# Patient Record
Sex: FEMALE
[De-identification: names, ages, dates, MRNs, and addresses within clinical notes are randomized per-mention and may not be internally consistent; named-entity substitution may affect disease eponyms.]

---

## 2022-11-01 PROBLEM — Z00.00 ENCOUNTER FOR PREVENTIVE HEALTH EXAMINATION: Status: ACTIVE | Noted: 2022-11-01

## 2022-11-02 ENCOUNTER — APPOINTMENT (OUTPATIENT)
Dept: UROLOGY | Facility: CLINIC | Age: 87
End: 2022-11-02

## 2022-11-02 VITALS
WEIGHT: 145 LBS | SYSTOLIC BLOOD PRESSURE: 145 MMHG | DIASTOLIC BLOOD PRESSURE: 82 MMHG | TEMPERATURE: 97.3 F | HEIGHT: 64 IN | BODY MASS INDEX: 24.75 KG/M2 | OXYGEN SATURATION: 95 % | HEART RATE: 67 BPM | RESPIRATION RATE: 16 BRPM

## 2022-11-02 DIAGNOSIS — N39.0 URINARY TRACT INFECTION, SITE NOT SPECIFIED: ICD-10-CM

## 2022-11-02 PROCEDURE — 51798 US URINE CAPACITY MEASURE: CPT

## 2022-11-02 PROCEDURE — 99204 OFFICE O/P NEW MOD 45 MIN: CPT

## 2022-11-04 NOTE — ASSESSMENT
[FreeTextEntry1] : Pt is an 86yo female presenting with recurrent UTI's. She denies any urinary symptoms today but does report vaginal irritation. I have send urine for culture as well as a vaginal swab.  She did not bring her prior results with her today.  We will reach out to her PCP for prior urine results. \par \par 1.UA/ UC \par 2. Vaginal swab.

## 2022-11-04 NOTE — HISTORY OF PRESENT ILLNESS
[FreeTextEntry1] : Pt is an 86 yo F who presents today with history of recurrent UTIs.  The patient denies any urinary symptoms at this time. She complains of vaginal irritation and reports "small bumps on her labia when she has a UTI". \par She denies vaginal itching. She states she has been treated for uti's  multiple times within the last year by her PCP Dr. Nyla Elias with Cipro and Nitrofurantoin with no resolution of symptoms. She urinates during the day 3-4x and at night 2x. Patient states when she laughs or coughs she leaks a small amount of urine. \par \par Udip: (+) small blood, small leuks\par PVR: 1st void 200cc 2nd void 35cc\par \par PMH:HTN, Seizures\par PSH:2 \par FH:Brother-lung caner Father-prostate cancer Sister- stomach cancer \par SH:Previous smoker ()\par \par Not sexually active. \par \par Habits:Drinks 14 ounces of water daily \par Allergies:NKDA\par \par Meds:Trileptal, Amlodipine, Aspirin, Toprolol XL, Hydrochlorothiazide

## 2022-11-04 NOTE — ADDENDUM
[FreeTextEntry1] : A portion of this note was written by [Franchesca Newton] on 11/02/2022  acting as a scribe for Dr. Arango. \par \par I have personally reviewed the chart and agree that the record accurately reflects my personal performance and the history, physical exam, assessment, and plan.\par

## 2022-11-04 NOTE — LETTER BODY
[Dear  ___] : Dear  [unfilled], [Consult Letter:] : I had the pleasure of evaluating your patient, [unfilled]. [Please see my note below.] : Please see my note below. [Consult Closing:] : Thank you very much for allowing me to participate in the care of this patient.  If you have any questions, please do not hesitate to contact me. [Sincerely,] : Sincerely, [FreeTextEntry3] : Dr. Delaney Arango\par

## 2022-12-29 LAB
APPEARANCE: CLEAR
BACTERIA GENITAL AEROBE CULT: NORMAL
BACTERIA UR CULT: NORMAL
BACTERIA: NEGATIVE
BILIRUBIN URINE: NEGATIVE
BLOOD URINE: NEGATIVE
COLOR: YELLOW
GLUCOSE QUALITATIVE U: NEGATIVE
HYALINE CASTS: 2 /LPF
KETONES URINE: NEGATIVE
LEUKOCYTE ESTERASE URINE: ABNORMAL
MICROSCOPIC-UA: NORMAL
NITRITE URINE: NEGATIVE
PH URINE: 6
PROTEIN URINE: NEGATIVE
RED BLOOD CELLS URINE: 2 /HPF
SPECIFIC GRAVITY URINE: 1.02
SQUAMOUS EPITHELIAL CELLS: 2 /HPF
UROBILINOGEN URINE: NORMAL
WHITE BLOOD CELLS URINE: 2 /HPF